# Patient Record
Sex: MALE | Race: WHITE | NOT HISPANIC OR LATINO | Employment: FULL TIME | URBAN - METROPOLITAN AREA
[De-identification: names, ages, dates, MRNs, and addresses within clinical notes are randomized per-mention and may not be internally consistent; named-entity substitution may affect disease eponyms.]

---

## 2022-12-06 ENCOUNTER — ANCILLARY PROCEDURE (OUTPATIENT)
Dept: GENERAL RADIOLOGY | Facility: CLINIC | Age: 22
End: 2022-12-06
Attending: NURSE PRACTITIONER
Payer: COMMERCIAL

## 2022-12-06 ENCOUNTER — OFFICE VISIT (OUTPATIENT)
Dept: URGENT CARE | Facility: URGENT CARE | Age: 22
End: 2022-12-06
Payer: COMMERCIAL

## 2022-12-06 VITALS
DIASTOLIC BLOOD PRESSURE: 56 MMHG | OXYGEN SATURATION: 98 % | HEART RATE: 59 BPM | WEIGHT: 180 LBS | BODY MASS INDEX: 21.92 KG/M2 | TEMPERATURE: 98.6 F | HEIGHT: 76 IN | SYSTOLIC BLOOD PRESSURE: 92 MMHG

## 2022-12-06 DIAGNOSIS — R05.1 ACUTE COUGH: ICD-10-CM

## 2022-12-06 DIAGNOSIS — R05.1 ACUTE COUGH: Primary | ICD-10-CM

## 2022-12-06 PROCEDURE — 99203 OFFICE O/P NEW LOW 30 MIN: CPT | Performed by: NURSE PRACTITIONER

## 2022-12-06 RX ORDER — BENZONATATE 200 MG/1
200 CAPSULE ORAL 3 TIMES DAILY PRN
Qty: 21 CAPSULE | Refills: 0 | Status: SHIPPED | OUTPATIENT
Start: 2022-12-06 | End: 2022-12-13

## 2022-12-06 NOTE — PATIENT INSTRUCTIONS
Tessalon Perles for lingering cough  Will hold on CBC and x-ray at this time  Likely a viral illness that is running its course  Return to clinic in 1 week for reevaluation if lingering or worsening such as fevers thick mucus chest pain bloody sputum  Rest! Your body needs more rest to heal.  Drink plenty of fluids (warm fluids like tea or soup are soothing and reduce cough)  Sit in the bathroom with a hot shower running and breathe in the steam.  Honey may soothe your sore throat and help manage your cough- may take straight or in warm water with lemon juice.  Avoid smoke (cigarettes, bonfires, fireplace, wood burning stoves).  Take Tylenol or an NSAID such as ibuprofen or naproxen as needed for pain.  Delsym (dextromethorphan polistirex) is an over the counter cough medication that lasts 12 hours.   Mucinex or Robitussin (guiafenesin) thin mucus and may help it to loosen more quickly  Good handwashing is the best way to prevent spread of germs  Present to emergency room if you develop trouble breathing, swallowing or cough-up blood.  Follow up with your primary care provider if symptoms worsen or fail to improve as expected.

## 2022-12-06 NOTE — PROGRESS NOTES
"Chief Complaint   Patient presents with     Urgent Care     Pt had cold 2 weeks ago and cough remains, minimal but is still there, coughing so much there has been some rib pain since      SUBJECTIVE:  Charan Tavera is a 21 year old male who presents to the clinic today with cough rib pain some sinus congestion for 3 weeks.  He feels like he might be better today but it was pretty bad past couple days.  Started with a flulike illness.  Declines hemoptysis severe shortness of breath recent fevers.    No past medical history on file.  TYLENOL [facility] OR, use as needed/dir (Patient not taking: Reported on 12/6/2022)    No current facility-administered medications on file prior to visit.    Social History     Tobacco Use     Smoking status: Never     Smokeless tobacco: Not on file   Substance Use Topics     Alcohol use: Not on file     No Known Allergies    Review of Systems   All systems negative except for those listed above in HPI.    EXAM:   BP 92/56 (BP Location: Left arm, Patient Position: Sitting, Cuff Size: Adult Large)   Pulse 59   Temp 98.6  F (37  C) (Temporal)   Ht 1.93 m (6' 4\")   Wt 81.6 kg (180 lb)   SpO2 98%   BMI 21.91 kg/m      Physical Exam  Vitals reviewed.   Constitutional:       General: He is not in acute distress.     Appearance: Normal appearance. He is not ill-appearing or toxic-appearing.   HENT:      Head: Normocephalic and atraumatic.      Nose: Nose normal.      Mouth/Throat:      Mouth: Mucous membranes are moist.      Pharynx: Oropharynx is clear.   Eyes:      Extraocular Movements: Extraocular movements intact.      Conjunctiva/sclera: Conjunctivae normal.      Pupils: Pupils are equal, round, and reactive to light.   Cardiovascular:      Rate and Rhythm: Normal rate.      Pulses: Normal pulses.   Pulmonary:      Effort: Pulmonary effort is normal. No respiratory distress.      Breath sounds: Normal breath sounds. No stridor. No wheezing, rhonchi or rales.   Chest:      Chest " wall: No tenderness.   Musculoskeletal:         General: Tenderness present. Normal range of motion.      Cervical back: Normal range of motion and neck supple.   Skin:     General: Skin is warm and dry.      Findings: No rash.   Neurological:      General: No focal deficit present.      Mental Status: He is alert and oriented to person, place, and time.   Psychiatric:         Mood and Affect: Mood normal.         Behavior: Behavior normal.       ASSESSMENT:    ICD-10-CM    1. Acute cough  R05.1 benzonatate (TESSALON) 200 MG capsule     CANCELED: XR Chest 2 Views     CANCELED: CBC with platelets and differential        PLAN:    Tessalon Perles for lingering cough  Will hold on CBC and x-ray at this time  Likely a viral illness that is running its course  Return to clinic in 1 week for reevaluation if lingering or worsening such as fevers thick mucus chest pain bloody sputum  Rest! Your body needs more rest to heal.  Drink plenty of fluids (warm fluids like tea or soup are soothing and reduce cough)  Sit in the bathroom with a hot shower running and breathe in the steam.  Honey may soothe your sore throat and help manage your cough- may take straight or in warm water with lemon juice.  Avoid smoke (cigarettes, bonfires, fireplace, wood burning stoves).  Take Tylenol or an NSAID such as ibuprofen or naproxen as needed for pain.  Delsym (dextromethorphan polistirex) is an over the counter cough medication that lasts 12 hours.   Mucinex or Robitussin (guiafenesin) thin mucus and may help it to loosen more quickly  Good handwashing is the best way to prevent spread of germs  Present to emergency room if you develop trouble breathing, swallowing or cough-up blood.  Follow up with your primary care provider if symptoms worsen or fail to improve as expected.    Follow up with primary care provider with any problems, questions or concerns or if symptoms worsen or fail to improve. Patient agreed to plan and verbalized  understanding.    Yesica Curtis, FNP-[facility]